# Patient Record
Sex: FEMALE | Race: WHITE | NOT HISPANIC OR LATINO | URBAN - METROPOLITAN AREA
[De-identification: names, ages, dates, MRNs, and addresses within clinical notes are randomized per-mention and may not be internally consistent; named-entity substitution may affect disease eponyms.]

---

## 2018-12-31 ENCOUNTER — OFFICE VISIT (OUTPATIENT)
Dept: FAMILY MEDICINE CLINIC | Facility: CLINIC | Age: 48
End: 2018-12-31

## 2018-12-31 VITALS
HEIGHT: 64 IN | DIASTOLIC BLOOD PRESSURE: 64 MMHG | HEART RATE: 68 BPM | SYSTOLIC BLOOD PRESSURE: 102 MMHG | RESPIRATION RATE: 18 BRPM | WEIGHT: 121 LBS | BODY MASS INDEX: 20.66 KG/M2 | TEMPERATURE: 97.5 F

## 2018-12-31 DIAGNOSIS — Z13.6 ENCOUNTER FOR LIPID SCREENING FOR CARDIOVASCULAR DISEASE: Primary | ICD-10-CM

## 2018-12-31 DIAGNOSIS — Z13.220 ENCOUNTER FOR LIPID SCREENING FOR CARDIOVASCULAR DISEASE: Primary | ICD-10-CM

## 2018-12-31 DIAGNOSIS — G43.109 MIGRAINE WITH AURA AND WITHOUT STATUS MIGRAINOSUS, NOT INTRACTABLE: ICD-10-CM

## 2018-12-31 LAB
ALBUMIN SERPL-MCNC: 4.31 G/DL (ref 3.2–4.8)
ALBUMIN/GLOB SERPL: 2 G/DL (ref 1.5–2.5)
ALP SERPL-CCNC: 43 U/L (ref 25–100)
ALT SERPL-CCNC: 21 U/L (ref 7–40)
AST SERPL-CCNC: 21 U/L (ref 0–33)
BILIRUB SERPL-MCNC: 0.6 MG/DL (ref 0.3–1.2)
BUN SERPL-MCNC: 15 MG/DL (ref 9–23)
BUN/CREAT SERPL: 19.5 (ref 7–25)
CALCIUM SERPL-MCNC: 9.1 MG/DL (ref 8.7–10.4)
CHLORIDE SERPL-SCNC: 107 MMOL/L (ref 99–109)
CHOLEST SERPL-MCNC: 180 MG/DL (ref 0–200)
CHOLEST/HDLC SERPL: 2.65 {RATIO}
CO2 SERPL-SCNC: 27 MMOL/L (ref 20–31)
CREAT SERPL-MCNC: 0.77 MG/DL (ref 0.6–1.3)
GLOBULIN SER CALC-MCNC: 2.2 GM/DL
GLUCOSE SERPL-MCNC: 66 MG/DL (ref 70–100)
HDLC SERPL-MCNC: 68 MG/DL (ref 40–60)
LDLC SERPL CALC-MCNC: 99 MG/DL (ref 0–100)
POTASSIUM SERPL-SCNC: 4.2 MMOL/L (ref 3.5–5.5)
PROT SERPL-MCNC: 6.5 G/DL (ref 5.7–8.2)
SODIUM SERPL-SCNC: 141 MMOL/L (ref 132–146)
TRIGL SERPL-MCNC: 67 MG/DL (ref 0–150)
VLDLC SERPL CALC-MCNC: 13.4 MG/DL

## 2018-12-31 PROCEDURE — 99202 OFFICE O/P NEW SF 15 MIN: CPT | Performed by: FAMILY MEDICINE

## 2018-12-31 RX ORDER — NORETHINDRONE ACETATE AND ETHINYL ESTRADIOL, ETHINYL ESTRADIOL AND FERROUS FUMARATE 1MG-10(24)
KIT ORAL DAILY
Refills: 3 | COMMUNITY
Start: 2018-12-01

## 2018-12-31 RX ORDER — VALACYCLOVIR HYDROCHLORIDE 1 G/1
TABLET, FILM COATED ORAL
Refills: 4 | COMMUNITY
Start: 2018-12-19

## 2018-12-31 RX ORDER — SUMATRIPTAN 50 MG/1
50 TABLET, FILM COATED ORAL
Qty: 12 TABLET | Refills: 5 | Status: SHIPPED | OUTPATIENT
Start: 2018-12-31

## 2018-12-31 RX ORDER — MULTIVITAMIN
TABLET ORAL
COMMUNITY
Start: 2014-08-19

## 2018-12-31 RX ORDER — SUMATRIPTAN 50 MG/1
TABLET, FILM COATED ORAL
COMMUNITY
Start: 2014-08-19 | End: 2018-12-31 | Stop reason: SDUPTHER

## 2018-12-31 NOTE — PROGRESS NOTES
Assessment/Plan       Problems Addressed this Visit        Cardiovascular and Mediastinum    Migraine with aura and without status migrainosus, not intractable    Relevant Medications    SUMAtriptan (IMITREX) 50 MG tablet      Other Visit Diagnoses     Encounter for lipid screening for cardiovascular disease    -  Primary    Relevant Orders    Comprehensive Metabolic Panel    Lipid Panel With / Chol / HDL Ratio            Follow up: Return if symptoms worsen or fail to improve.     DISCUSSION  Here for follow-up of migraine headaches.  Has been greater than 3 years since she was last seen.  Refilled Imitrex since it is helping.  If headaches become more frequent, she will let us know.    Check labs for lipid screening.      MEDICATIONS PRESCRIBED  Requested Prescriptions     Signed Prescriptions Disp Refills   • SUMAtriptan (IMITREX) 50 MG tablet 12 tablet 5     Sig: Take 1 tablet by mouth Every 2 (Two) Hours As Needed for Migraine. No greater than 2 in 24 hour period            -------------------------------------------    Subjective     Chief Complaint   Patient presents with   • Migraine     refill of migraine   • Biometric Screening     wanted to check cholesterol?         History of Present Illness    Here for follow-up of migraine headaches.  Has not been seen here since August 2014.    Migraine    Gets once about every 3-4 months  Typical migraine, sees a pinpoint block, visual aura and then headache after that,   Sumatriptan helps quickly and takes aleve as well  Still gets some pain but kwaku to manage it    Last month, had 2 headaches on the same day  Was better and then came back in 2 hours    Would like to have chol checked  Not eaten today  Had one bite of pop tart    No change in history    Left great toe, decreased sensation x 6 months  In the corner only,one small spot.   Not completely numb  No back pain   No diabetic    LMP: on OCP for endometriosis and no periods  Sees Oll[facility] GARCIA  "[facility]  mammogram 2017 and normal        Social History     Tobacco Use   Smoking Status Never Smoker   Smokeless Tobacco Never Used        Past Medical History,Medications, Allergies, and social history was reviewed.      Review of Systems   Constitutional: Negative.    HENT: Negative.    Respiratory: Negative.    Cardiovascular: Negative.    Gastrointestinal: Negative.    Musculoskeletal: Negative.    Neurological: Positive for headache.   Psychiatric/Behavioral: Negative.        Objective     Vitals:    12/31/18 1010   BP: 102/64   Pulse: 68   Resp: 18   Temp: 97.5 °F (36.4 °C)   Weight: 54.9 kg (121 lb)   Height: 162.6 cm (64\")          Physical Exam   Constitutional: She appears well-developed and well-nourished.   HENT:   Head: Normocephalic and atraumatic.   Right Ear: Hearing, tympanic membrane, external ear and ear canal normal.   Left Ear: Hearing, tympanic membrane, external ear and ear canal normal.   Mouth/Throat: Oropharynx is clear and moist.   Eyes: Conjunctivae and EOM are normal. Pupils are equal, round, and reactive to light.   Neck: Normal range of motion. Neck supple. No thyromegaly present.   Cardiovascular: Normal rate, regular rhythm and normal heart sounds. Exam reveals no gallop and no friction rub.   No murmur heard.  Pulmonary/Chest: Effort normal and breath sounds normal. No respiratory distress. She has no wheezes. She has no rales.   Abdominal: Soft. Bowel sounds are normal. She exhibits no distension. There is no tenderness. There is no rebound and no guarding.   Musculoskeletal: She exhibits no edema.   Neurological: She is alert.   Skin: Skin is warm and dry.   Psychiatric: She has a normal mood and affect.   Nursing note and vitals reviewed.                Taurus Freedman MD    "

## 2020-05-26 ENCOUNTER — OFFICE VISIT (OUTPATIENT)
Dept: FAMILY MEDICINE CLINIC | Facility: CLINIC | Age: 50
End: 2020-05-26

## 2020-05-26 VITALS
SYSTOLIC BLOOD PRESSURE: 106 MMHG | BODY MASS INDEX: 21.07 KG/M2 | RESPIRATION RATE: 16 BRPM | TEMPERATURE: 98.4 F | HEART RATE: 85 BPM | WEIGHT: 123.4 LBS | DIASTOLIC BLOOD PRESSURE: 70 MMHG | OXYGEN SATURATION: 98 % | HEIGHT: 64 IN

## 2020-05-26 DIAGNOSIS — M79.10 MYALGIA: Primary | ICD-10-CM

## 2020-05-26 DIAGNOSIS — M25.50 ARTHRALGIA OF MULTIPLE JOINTS: ICD-10-CM

## 2020-05-26 PROCEDURE — 99214 OFFICE O/P EST MOD 30 MIN: CPT | Performed by: FAMILY MEDICINE

## 2020-05-26 RX ORDER — LORATADINE 10 MG/1
10 TABLET ORAL DAILY
COMMUNITY

## 2020-05-26 NOTE — PROGRESS NOTES
Assessment/Plan       Problems Addressed this Visit     None      Visit Diagnoses     Myalgia    -  Primary    Relevant Orders    CBC & Differential    Comprehensive Metabolic Panel    Sedimentation Rate    STEPHANIE by IFA, Reflex 9-biomarkers profile    Rheumatoid Factor    C-reactive Protein    Arthralgia of multiple joints        Relevant Orders    CBC & Differential    Comprehensive Metabolic Panel    Sedimentation Rate    STEPHANIE by IFA, Reflex 9-biomarkers profile    Rheumatoid Factor    C-reactive Protein            Follow up: Return if symptoms worsen or fail to improve.     DISCUSSION  Persistent recurrent myalgia and arthralgia.  Check labs as noted.  Family history of polymyalgia rheumatica.    Further plan once we get labs back.        MEDICATIONS PRESCRIBED  Requested Prescriptions      No prescriptions requested or ordered in this encounter            -------------------------------------------    Subjective     Chief Complaint   Patient presents with   • Muscle Pain     worse in the morning, better throughout the day, soreness/pain         Muscle Pain   This is a new (seems to be muscle and not joint. no new meds. ) problem. Episode onset: one month. The problem occurs daily (in the am and is worse). The problem has been waxing and waning since onset. Associated with: no new meds. Pain location: both legs, back of legs, around the knees, hips and the top oif the hamstrings, calf, top of back, shoulders and neck.  The pain is medium. Pertinent negatives include no fever, rash or shortness of breath. Past treatments include nothing. The treatment provided no relief. There is no swelling present. There is no history of chronic back pain or rheumatic disease.       Mom has PMR, no meds now and she had been on prednisone    no recent illness    Lasts 7-8 pain scale in am   Worse at 2 hrs  Now 2-3 in the late afternoon and in the evening    Hurts to bend leg and back          Social History     Tobacco Use  "  Smoking Status Never Smoker   Smokeless Tobacco Never Used          Past Medical History,Medications, Allergies, and social history was reviewed.          Review of Systems   Constitutional: Negative.  Negative for fever.   HENT: Negative.    Respiratory: Negative.  Negative for shortness of breath.    Cardiovascular: Negative.    Gastrointestinal: Negative.    Musculoskeletal: Positive for arthralgias and myalgias.   Skin: Negative for rash.   Neurological: Negative.    Psychiatric/Behavioral: Negative.        Objective     Vitals:    05/26/20 1645   BP: 106/70   Pulse: 85   Resp: 16   Temp: 98.4 °F (36.9 °C)   TempSrc: Temporal   SpO2: 98%   Weight: 56 kg (123 lb 6.4 oz)   Height: 162.6 cm (64.02\")          Physical Exam   Constitutional: She is oriented to person, place, and time. She appears well-developed and well-nourished.   HENT:   Head: Normocephalic and atraumatic.   Right Ear: Hearing and external ear normal.   Left Ear: Hearing and external ear normal.   Mouth/Throat: Oropharynx is clear and moist.   Eyes: Pupils are equal, round, and reactive to light. Conjunctivae and EOM are normal.   Cardiovascular: Normal rate, regular rhythm and normal heart sounds. Exam reveals no friction rub.   No murmur heard.  Pulmonary/Chest: Effort normal and breath sounds normal. No respiratory distress. She has no wheezes. She has no rales.   Musculoskeletal:   Diffuse soreness of both lower extremities and thighs and knees.  Also along shoulders and upper back.   Neurological: She is alert and oriented to person, place, and time.   Skin: Skin is warm.   Psychiatric: She has a normal mood and affect. Her behavior is normal.   Nursing note and vitals reviewed.    No significant swelling of the proximal hand joints.            Taurus Freedman MD    "

## 2020-05-29 ENCOUNTER — TELEPHONE (OUTPATIENT)
Dept: FAMILY MEDICINE CLINIC | Facility: CLINIC | Age: 50
End: 2020-05-29

## 2020-05-29 DIAGNOSIS — M79.10 MYALGIA: ICD-10-CM

## 2020-05-29 DIAGNOSIS — M25.50 ARTHRALGIA OF MULTIPLE JOINTS: Primary | ICD-10-CM

## 2020-05-29 LAB
ALBUMIN SERPL-MCNC: 4 G/DL (ref 3.5–5.2)
ALBUMIN/GLOB SERPL: 1.4 G/DL
ALP SERPL-CCNC: 55 U/L (ref 39–117)
ALT SERPL-CCNC: 12 U/L (ref 1–33)
ANA HOMOGEN TITR SER: ABNORMAL {TITER}
ANA TITR SER IF: POSITIVE {TITER}
AST SERPL-CCNC: 15 U/L (ref 1–32)
BASOPHILS # BLD AUTO: 0.05 10*3/MM3 (ref 0–0.2)
BASOPHILS NFR BLD AUTO: 0.7 % (ref 0–1.5)
BILIRUB SERPL-MCNC: 0.4 MG/DL (ref 0.2–1.2)
BUN SERPL-MCNC: 11 MG/DL (ref 6–20)
BUN/CREAT SERPL: 14.9 (ref 7–25)
CALCIUM SERPL-MCNC: 9.1 MG/DL (ref 8.6–10.5)
CENTROMERE B AB SER-ACNC: <0.2 AI (ref 0–0.9)
CHLORIDE SERPL-SCNC: 101 MMOL/L (ref 98–107)
CHROMATIN AB SERPL-ACNC: <0.2 AI (ref 0–0.9)
CO2 SERPL-SCNC: 24.7 MMOL/L (ref 22–29)
CREAT SERPL-MCNC: 0.74 MG/DL (ref 0.57–1)
CRP SERPL-MCNC: 3.43 MG/DL (ref 0–0.5)
DSDNA AB SER-ACNC: <1 IU/ML (ref 0–9)
ENA JO1 AB SER-ACNC: <0.2 AI (ref 0–0.9)
ENA RNP AB SER-ACNC: 0.3 AI (ref 0–0.9)
ENA SCL70 AB SER-ACNC: <0.2 AI (ref 0–0.9)
ENA SM AB SER-ACNC: <0.2 AI (ref 0–0.9)
ENA SS-A AB SER-ACNC: <0.2 AI (ref 0–0.9)
ENA SS-B AB SER-ACNC: <0.2 AI (ref 0–0.9)
EOSINOPHIL # BLD AUTO: 0.1 10*3/MM3 (ref 0–0.4)
EOSINOPHIL NFR BLD AUTO: 1.3 % (ref 0.3–6.2)
ERYTHROCYTE [DISTWIDTH] IN BLOOD BY AUTOMATED COUNT: 12.7 % (ref 12.3–15.4)
ERYTHROCYTE [SEDIMENTATION RATE] IN BLOOD BY WESTERGREN METHOD: 35 MM/HR (ref 0–20)
GLOBULIN SER CALC-MCNC: 2.8 GM/DL
GLUCOSE SERPL-MCNC: 83 MG/DL (ref 65–99)
HCT VFR BLD AUTO: 39.3 % (ref 34–46.6)
HGB BLD-MCNC: 13.4 G/DL (ref 12–15.9)
IMM GRANULOCYTES # BLD AUTO: 0.03 10*3/MM3 (ref 0–0.05)
IMM GRANULOCYTES NFR BLD AUTO: 0.4 % (ref 0–0.5)
LABORATORY COMMENT REPORT: ABNORMAL
LYMPHOCYTES # BLD AUTO: 1.38 10*3/MM3 (ref 0.7–3.1)
LYMPHOCYTES NFR BLD AUTO: 18.1 % (ref 19.6–45.3)
Lab: ABNORMAL
Lab: ABNORMAL
MCH RBC QN AUTO: 32.9 PG (ref 26.6–33)
MCHC RBC AUTO-ENTMCNC: 34.1 G/DL (ref 31.5–35.7)
MCV RBC AUTO: 96.6 FL (ref 79–97)
MONOCYTES # BLD AUTO: 0.41 10*3/MM3 (ref 0.1–0.9)
MONOCYTES NFR BLD AUTO: 5.4 % (ref 5–12)
NEUTROPHILS # BLD AUTO: 5.66 10*3/MM3 (ref 1.7–7)
NEUTROPHILS NFR BLD AUTO: 74.1 % (ref 42.7–76)
NRBC BLD AUTO-RTO: 0 /100 WBC (ref 0–0.2)
PLATELET # BLD AUTO: 315 10*3/MM3 (ref 140–450)
POTASSIUM SERPL-SCNC: 4.3 MMOL/L (ref 3.5–5.2)
PROT SERPL-MCNC: 6.8 G/DL (ref 6–8.5)
RBC # BLD AUTO: 4.07 10*6/MM3 (ref 3.77–5.28)
RHEUMATOID FACT SERPL-ACNC: <10 IU/ML (ref 0–13.9)
SODIUM SERPL-SCNC: 138 MMOL/L (ref 136–145)
WBC # BLD AUTO: 7.63 10*3/MM3 (ref 3.4–10.8)

## 2020-05-29 RX ORDER — PREDNISONE 20 MG/1
40 TABLET ORAL DAILY
Qty: 10 TABLET | Refills: 0 | Status: SHIPPED | OUTPATIENT
Start: 2020-05-29 | End: 2020-06-03

## 2020-05-29 NOTE — TELEPHONE ENCOUNTER
Patient is calling stating that she is in major pain, she had labs done yesterday and was told those results would be in today. Patient wanted to know if Dr. Freedman could call in prednisone or something that he thinks would be beneficial to help with the pain. Patient would like this medication today if possible because she can not go all weekend with the pain she is in. Please advise and call back    [phone]    Confirmed pharmacy

## 2020-05-29 NOTE — TELEPHONE ENCOUNTER
Please call, looks like labs not back yet but will send in some prednisone over the weekend. It will be a 5 day course and should have the results by then. I sent to [facility] in [place]. Don't take the Prednisone in the evening. Earlier in the day better since can keep her awake. Take with food. CAn take both pills each day at the same time. Tylenol ok to take if needed but not ibuprofen or aleve.

## 2020-05-29 NOTE — TELEPHONE ENCOUNTER
S/W pt to inform her Prednisone is sent into pharmacy. Informed her of instructions on taking medication. Pt verbalized understanding and appreciation.

## 2020-06-01 DIAGNOSIS — R76.8 ANA POSITIVE: Primary | ICD-10-CM

## 2020-06-01 DIAGNOSIS — M25.50 ARTHRALGIA OF MULTIPLE JOINTS: ICD-10-CM

## 2020-06-01 RX ORDER — PREDNISONE 10 MG/1
TABLET ORAL
Qty: 30 TABLET | Refills: 0 | Status: SHIPPED | OUTPATIENT
Start: 2020-06-01 | End: 2020-06-16

## 2020-06-16 DIAGNOSIS — M79.10 MYALGIA: Primary | ICD-10-CM

## 2020-06-16 RX ORDER — PREDNISONE 10 MG/1
TABLET ORAL
Qty: 20 TABLET | Refills: 0 | Status: SHIPPED | OUTPATIENT
Start: 2020-06-16

## 2024-06-08 PROBLEM — R79.82 CRP ELEVATED: Status: ACTIVE | Noted: 2024-06-08

## 2024-06-08 PROBLEM — R70.0 ESR RAISED: Status: ACTIVE | Noted: 2024-06-08

## 2024-06-08 PROBLEM — M35.3 PMR (POLYMYALGIA RHEUMATICA): Status: ACTIVE | Noted: 2024-06-08

## 2024-06-08 PROBLEM — M79.7 FIBROMYALGIA: Status: ACTIVE | Noted: 2024-06-08

## 2024-06-08 PROBLEM — R76.8 POSITIVE ANA (ANTINUCLEAR ANTIBODY): Status: ACTIVE | Noted: 2024-06-08

## 2024-06-08 PROBLEM — M19.90 OSTEOARTHRITIS: Status: ACTIVE | Noted: 2024-06-08

## 2024-06-10 ENCOUNTER — LAB (OUTPATIENT)
Facility: HOSPITAL | Age: 54
End: 2024-06-10
Payer: COMMERCIAL

## 2024-06-10 ENCOUNTER — OFFICE VISIT (OUTPATIENT)
Age: 54
End: 2024-06-10
Payer: COMMERCIAL

## 2024-06-10 VITALS
HEART RATE: 78 BPM | BODY MASS INDEX: 22.02 KG/M2 | WEIGHT: 129 LBS | SYSTOLIC BLOOD PRESSURE: 128 MMHG | TEMPERATURE: 97.5 F | DIASTOLIC BLOOD PRESSURE: 78 MMHG | HEIGHT: 64 IN

## 2024-06-10 DIAGNOSIS — Z79.1 NSAID LONG-TERM USE: Chronic | ICD-10-CM

## 2024-06-10 DIAGNOSIS — M15.9 PRIMARY OSTEOARTHRITIS INVOLVING MULTIPLE JOINTS: Chronic | ICD-10-CM

## 2024-06-10 DIAGNOSIS — M35.3 PMR (POLYMYALGIA RHEUMATICA): Chronic | ICD-10-CM

## 2024-06-10 DIAGNOSIS — M35.3 PMR (POLYMYALGIA RHEUMATICA): Primary | Chronic | ICD-10-CM

## 2024-06-10 DIAGNOSIS — M79.7 FIBROMYALGIA: Chronic | ICD-10-CM

## 2024-06-10 DIAGNOSIS — L98.9 SKIN LESION OF BACK: ICD-10-CM

## 2024-06-10 PROCEDURE — 85025 COMPLETE CBC W/AUTO DIFF WBC: CPT

## 2024-06-10 PROCEDURE — 86140 C-REACTIVE PROTEIN: CPT

## 2024-06-10 PROCEDURE — 99214 OFFICE O/P EST MOD 30 MIN: CPT | Performed by: INTERNAL MEDICINE

## 2024-06-10 PROCEDURE — 36415 COLL VENOUS BLD VENIPUNCTURE: CPT

## 2024-06-10 PROCEDURE — 85652 RBC SED RATE AUTOMATED: CPT

## 2024-06-10 PROCEDURE — 80053 COMPREHEN METABOLIC PANEL: CPT

## 2024-06-10 RX ORDER — MELOXICAM 15 MG/1
15 TABLET ORAL DAILY PRN
Qty: 30 TABLET | Refills: 6 | Status: SHIPPED | OUTPATIENT
Start: 2024-06-10

## 2024-06-10 RX ORDER — DULOXETIN HYDROCHLORIDE 30 MG/1
30 CAPSULE, DELAYED RELEASE ORAL DAILY
Qty: 30 CAPSULE | Refills: 6 | Status: SHIPPED | OUTPATIENT
Start: 2024-06-10

## 2024-06-10 NOTE — ASSESSMENT & PLAN NOTE
1. H & P consistent with this diagnosis.   2. Encourage aerobic activity and sleep hygiene.  3. If she has not had a sleep study/consultation consider getting this done.   4. We gave her an educational handout to take home and review regarding fibromyalgia.   5. She will follow up with us in 6 months.  6. Tylenol PRN is ok as directed as directed  7. Today she informed me that she lowered her duloxetine from 30 mg BID to 30 mg once/day.   8. Continue/refill Meloxicam PRN once/day

## 2024-06-10 NOTE — PROGRESS NOTES
Office Follow Up      Date: 06/10/2024   Patient Name: Ellyn Cha  MRN: [id number]  YOB: 1970    Referring Physician: Taurus Freedman MD     Chief Complaint   Patient presents with    Polymyalgia rheumatica     Follow up    Fibromyalgia     Follow up    Osteoarthritis     Follow up       History of Present Illness: Ellyn Cha is a 53 y.o. female who is here today for follow up.     No recent injuries or infections. No fevers.    She established care with us on 6/30/20. Her STEPHANIE test was positive. Her other autoimmune tests were negative/normal. Her CRP test was markedly elevated. We started her on 15 mg/day of prednisone for presumed PMR. She has long since tapered off of prednisone.  We believe her PMR is in remission.     Presently we prescribe her meloxicam 15 mg PO once/day PRN and duloxetine.  She has lowered her duloxetine dose from 30 mg BID to once/day.      Today she rates her pain as 2.5/10 in severity. She has 30 minutes/day of morning stiffness. No red or hot joints.  No joint swelling. No muscle pain or weakness. No back or neck problems.     She has a spot on her back that she would like to have checked. She would like to see dermatology. No hair loss. No rash. No headaches or paresthesias. No lymphadenopathy. No abnormal bruising/bleeding. No GI or  issues. No chest pain or shortness of breath. No sicca symptoms.         Subjective     Review of Systems   Constitutional: Negative.    HENT: Negative.     Eyes: Negative.    Respiratory: Negative.     Cardiovascular: Negative.    Gastrointestinal: Negative.    Endocrine: Negative.    Genitourinary: Negative.    Musculoskeletal:  Positive for arthralgias.   Skin:  Positive for skin lesions (on back).   Allergic/Immunologic: Negative.    Neurological:  Positive for dizziness.   Hematological: Negative.    Psychiatric/Behavioral:  Positive for depressed mood.    All other systems reviewed and are negative.    "      Current Outpatient Medications:     Cholecalciferol (Vitamin D3) 125 MCG (5000 UT) tablet dispersible, Place 1 tablet on the tongue Daily., Disp: , Rfl:     DULoxetine (CYMBALTA) 30 MG capsule, Take 1 capsule by mouth Daily., Disp: 30 capsule, Rfl: 6    estradiol (CLIMARA) 0.1 MG/24HR patch, Place 2 patches on the skin as directed by provider 1 (One) Time Per Week., Disp: , Rfl:     LO LOESTRIN FE 1 MG-10 MCG / 10 MCG tablet, Take  by mouth Daily., Disp: , Rfl: 3    meloxicam (MOBIC) 15 MG tablet, Take 1 tablet by mouth Daily As Needed for Mild Pain., Disp: 30 tablet, Rfl: 6    Multiple Vitamin (MULTI-VITAMIN DAILY) tablet, Take  by mouth., Disp: , Rfl:     Probiotic Product (PROBIOTIC FORMULA PO), Take  by mouth., Disp: , Rfl:     Progesterone (PROMETRIUM) 100 MG capsule, Take 1 capsule by mouth Daily. FOR 10 DAYS IN THE EVENINT, Disp: , Rfl:     valACYclovir (VALTREX) 1000 MG tablet, TK 1 T PO TID, Disp: , Rfl: 4    No Known Allergies    I have reviewed and updated the patient's chief complaint, history of present illness, review of systems, past medical history, surgical history, family history, social history, medications and allergy list as appropriate.     Objective      Vitals:    06/10/24 1553   BP: 128/78   BP Location: Left arm   Pulse: 78   Temp: 97.5 °F (36.4 °C)   Weight: 58.5 kg (129 lb)   Height: 162.6 cm (64\")   PainSc:   2   PainLoc: Leg     Body mass index is 22.14 kg/m².      Physical Exam   General: Well appearing 53 year old  female. Not in distress. She is ambulating unassisted.   SKIN: No rashes. She has a small raised circular lesion on her back. No alopecia. No subcutaneous nodules. No digital pits or ulcers. No sclerodactyly.   HEENT: NCAT. Conjunctiva clear, no photophobia. No oral or nasal ulcers. Hearing intact.    Pulmonary: Clear to auscultation bilaterally. No wheezing, rales, or rhonchi.  CV: Regular rate and rhythm. No murmurs, rubs, or gallops.   Psych: Normal mood " and affect. Alert and oriented x 3.   Extremities: No cyanosis or edema.   Musculoskeletal: No joint swelling. No warmth or erythema. Normal range of motion of the wrists, ankles, elbows, and knees. She has myofascial tenderness above and below the waist.   Lymph: No palpable cervical adenopathy          Procedures    Assessment / Plan      Assessment & Plan  PMR (polymyalgia rheumatica)  * Mother diagnosed with PMR  * Medications/treatments/interventions tried include: Prednisone taper 6/1/20 - 6/13/20, Tylenol, prednisone daily, meloxicam, Cymbalta, CBD products   * 5/27/20: STEPHANIE 1:640 (homogenous), DS DNA normal, RNP normal, Freedman normal, SCL 70 normal, SSA and SSB Normal, Chromatin normal, Centromere normal, CRP 3.43 (<0.50), RF negative, ESR 35 (<30), CMP normal, CBC normal   * 6/30/20 CRP was 55.0, STEPHANIE 1:1280, all other testing was fine  1. We believe that her PMR is in remission.   2. She is not on steroids. She does not have GCA symptoms.   3. Check labs  4. Follow up in 4-6 months  Fibromyalgia  1. H & P consistent with this diagnosis.   2. Encourage aerobic activity and sleep hygiene.  3. If she has not had a sleep study/consultation consider getting this done.   4. We gave her an educational handout to take home and review regarding fibromyalgia.   5. She will follow up with us in 6 months.  6. Tylenol PRN is ok as directed as directed  7. Today she informed me that she lowered her duloxetine from 30 mg BID to 30 mg once/day.   8. Continue/refill Meloxicam PRN once/day   Primary osteoarthritis involving multiple joints  1. Tylenol PRN is ok as directed  2. Continue/refill meloxicam   3. She seems stable today   NSAID long-term use  * Meloxicam 15 mg PO once/day PRN for joint pain relief   Do not take over-the counter anti-inflammatory medicines, such as ibuprofen or naproxen (Advil, Motrin, Aleve and others), as they may cause problems when combined with your prescription medications.  In general, low dose  daily aspirin for the treatment or prevention of heart disease or stroke is safe to take.  Acetaminophen (Tylenol) is generally safe to take as directed for headaches, cramps or other aches and pains or fever reduction.    Skin lesion of back  She has a spot on her back that she would like to have checked. She would like to see dermatology.   We will refer as requested.  To me this looks like a sebaceous cyst vs a lipoma.     Orders Placed This Encounter   Procedures    C-reactive Protein    Comprehensive Metabolic Panel    CBC Auto Differential    Sedimentation Rate    Ambulatory Referral to Dermatology     New Medications Ordered This Visit   Medications    meloxicam (MOBIC) 15 MG tablet     Sig: Take 1 tablet by mouth Daily As Needed for Mild Pain.     Dispense:  30 tablet     Refill:  6    DULoxetine (CYMBALTA) 30 MG capsule     Sig: Take 1 capsule by mouth Daily.     Dispense:  30 capsule     Refill:  6         Follow Up:   Return in about 6 months (around 12/10/2024).      Galo Natarajan DO  [facility]

## 2024-06-10 NOTE — ASSESSMENT & PLAN NOTE
* Mother diagnosed with PMR  * Medications/treatments/interventions tried include: Prednisone taper 6/1/20 - 6/13/20, Tylenol, prednisone daily, meloxicam, Cymbalta, CBD products   * 5/27/20: STEPHANIE 1:640 (homogenous), DS DNA normal, RNP normal, Freedman normal, SCL 70 normal, SSA and SSB Normal, Chromatin normal, Centromere normal, CRP 3.43 (<0.50), RF negative, ESR 35 (<30), CMP normal, CBC normal   * 6/30/20 CRP was 55.0, STEPHANIE 1:1280, all other testing was fine  1. We believe that her PMR is in remission.   2. She is not on steroids. She does not have GCA symptoms.   3. Check labs  4. Follow up in 4-6 months

## 2024-06-10 NOTE — ASSESSMENT & PLAN NOTE
* Meloxicam 15 mg PO once/day PRN for joint pain relief   Do not take over-the counter anti-inflammatory medicines, such as ibuprofen or naproxen (Advil, Motrin, Aleve and others), as they may cause problems when combined with your prescription medications.  In general, low dose daily aspirin for the treatment or prevention of heart disease or stroke is safe to take.  Acetaminophen (Tylenol) is generally safe to take as directed for headaches, cramps or other aches and pains or fever reduction.

## 2024-06-11 LAB
ALBUMIN SERPL-MCNC: 4.5 G/DL (ref 3.5–5.2)
ALBUMIN/GLOB SERPL: 1.9 G/DL
ALP SERPL-CCNC: 45 U/L (ref 39–117)
ALT SERPL W P-5'-P-CCNC: 15 U/L (ref 1–33)
ANION GAP SERPL CALCULATED.3IONS-SCNC: 8 MMOL/L (ref 5–15)
AST SERPL-CCNC: 16 U/L (ref 1–32)
BASOPHILS # BLD AUTO: 0.07 10*3/MM3 (ref 0–0.2)
BASOPHILS NFR BLD AUTO: 0.7 % (ref 0–1.5)
BILIRUB SERPL-MCNC: 0.3 MG/DL (ref 0–1.2)
BUN SERPL-MCNC: 18 MG/DL (ref 6–20)
BUN/CREAT SERPL: 23.1 (ref 7–25)
CALCIUM SPEC-SCNC: 9.5 MG/DL (ref 8.6–10.5)
CHLORIDE SERPL-SCNC: 104 MMOL/L (ref 98–107)
CO2 SERPL-SCNC: 29 MMOL/L (ref 22–29)
CREAT SERPL-MCNC: 0.78 MG/DL (ref 0.57–1)
CRP SERPL-MCNC: <0.3 MG/DL (ref 0–0.5)
DEPRECATED RDW RBC AUTO: 46.5 FL (ref 37–54)
EGFRCR SERPLBLD CKD-EPI 2021: 90.4 ML/MIN/1.73
EOSINOPHIL # BLD AUTO: 0.19 10*3/MM3 (ref 0–0.4)
EOSINOPHIL NFR BLD AUTO: 2 % (ref 0.3–6.2)
ERYTHROCYTE [DISTWIDTH] IN BLOOD BY AUTOMATED COUNT: 13.1 % (ref 12.3–15.4)
ERYTHROCYTE [SEDIMENTATION RATE] IN BLOOD: 5 MM/HR (ref 0–30)
GLOBULIN UR ELPH-MCNC: 2.4 GM/DL
GLUCOSE SERPL-MCNC: 97 MG/DL (ref 65–99)
HCT VFR BLD AUTO: 38.5 % (ref 34–46.6)
HGB BLD-MCNC: 13 G/DL (ref 12–15.9)
IMM GRANULOCYTES # BLD AUTO: 0.04 10*3/MM3 (ref 0–0.05)
IMM GRANULOCYTES NFR BLD AUTO: 0.4 % (ref 0–0.5)
LYMPHOCYTES # BLD AUTO: 1.93 10*3/MM3 (ref 0.7–3.1)
LYMPHOCYTES NFR BLD AUTO: 19.9 % (ref 19.6–45.3)
MCH RBC QN AUTO: 32.9 PG (ref 26.6–33)
MCHC RBC AUTO-ENTMCNC: 33.8 G/DL (ref 31.5–35.7)
MCV RBC AUTO: 97.5 FL (ref 79–97)
MONOCYTES # BLD AUTO: 0.38 10*3/MM3 (ref 0.1–0.9)
MONOCYTES NFR BLD AUTO: 3.9 % (ref 5–12)
NEUTROPHILS NFR BLD AUTO: 7.07 10*3/MM3 (ref 1.7–7)
NEUTROPHILS NFR BLD AUTO: 73.1 % (ref 42.7–76)
NRBC BLD AUTO-RTO: 0 /100 WBC (ref 0–0.2)
PLATELET # BLD AUTO: 238 10*3/MM3 (ref 140–450)
PMV BLD AUTO: 10.4 FL (ref 6–12)
POTASSIUM SERPL-SCNC: 4.1 MMOL/L (ref 3.5–5.2)
PROT SERPL-MCNC: 6.9 G/DL (ref 6–8.5)
RBC # BLD AUTO: 3.95 10*6/MM3 (ref 3.77–5.28)
SODIUM SERPL-SCNC: 141 MMOL/L (ref 136–145)
WBC NRBC COR # BLD AUTO: 9.68 10*3/MM3 (ref 3.4–10.8)

## 2024-12-13 ENCOUNTER — LAB (OUTPATIENT)
Facility: HOSPITAL | Age: 54
End: 2024-12-13
Payer: COMMERCIAL

## 2024-12-13 ENCOUNTER — OFFICE VISIT (OUTPATIENT)
Age: 54
End: 2024-12-13
Payer: COMMERCIAL

## 2024-12-13 VITALS
HEART RATE: 87 BPM | BODY MASS INDEX: 21.91 KG/M2 | HEIGHT: 64 IN | WEIGHT: 128.3 LBS | SYSTOLIC BLOOD PRESSURE: 110 MMHG | DIASTOLIC BLOOD PRESSURE: 72 MMHG | TEMPERATURE: 97.5 F

## 2024-12-13 DIAGNOSIS — M79.7 FIBROMYALGIA: Chronic | ICD-10-CM

## 2024-12-13 DIAGNOSIS — M35.3 PMR (POLYMYALGIA RHEUMATICA): Chronic | ICD-10-CM

## 2024-12-13 DIAGNOSIS — Z79.1 NSAID LONG-TERM USE: Chronic | ICD-10-CM

## 2024-12-13 DIAGNOSIS — M35.3 PMR (POLYMYALGIA RHEUMATICA): Primary | Chronic | ICD-10-CM

## 2024-12-13 DIAGNOSIS — M15.0 PRIMARY OSTEOARTHRITIS INVOLVING MULTIPLE JOINTS: Chronic | ICD-10-CM

## 2024-12-13 LAB
ALBUMIN SERPL-MCNC: 4.3 G/DL (ref 3.5–5.2)
ALBUMIN/GLOB SERPL: 1.5 G/DL
ALP SERPL-CCNC: 55 U/L (ref 39–117)
ALT SERPL W P-5'-P-CCNC: 18 U/L (ref 1–33)
ANION GAP SERPL CALCULATED.3IONS-SCNC: 10 MMOL/L (ref 5–15)
AST SERPL-CCNC: 23 U/L (ref 1–32)
BASOPHILS # BLD AUTO: 0.06 10*3/MM3 (ref 0–0.2)
BASOPHILS NFR BLD AUTO: 0.7 % (ref 0–1.5)
BILIRUB SERPL-MCNC: 0.2 MG/DL (ref 0–1.2)
BUN SERPL-MCNC: 16 MG/DL (ref 6–20)
BUN/CREAT SERPL: 21.6 (ref 7–25)
CALCIUM SPEC-SCNC: 9.6 MG/DL (ref 8.6–10.5)
CHLORIDE SERPL-SCNC: 103 MMOL/L (ref 98–107)
CO2 SERPL-SCNC: 26 MMOL/L (ref 22–29)
CREAT SERPL-MCNC: 0.74 MG/DL (ref 0.57–1)
CRP SERPL-MCNC: <0.3 MG/DL (ref 0–0.5)
DEPRECATED RDW RBC AUTO: 45.4 FL (ref 37–54)
EGFRCR SERPLBLD CKD-EPI 2021: 95.7 ML/MIN/1.73
EOSINOPHIL # BLD AUTO: 0.13 10*3/MM3 (ref 0–0.4)
EOSINOPHIL NFR BLD AUTO: 1.6 % (ref 0.3–6.2)
ERYTHROCYTE [DISTWIDTH] IN BLOOD BY AUTOMATED COUNT: 13 % (ref 12.3–15.4)
GLOBULIN UR ELPH-MCNC: 2.9 GM/DL
GLUCOSE SERPL-MCNC: 84 MG/DL (ref 65–99)
HCT VFR BLD AUTO: 38 % (ref 34–46.6)
HGB BLD-MCNC: 12.9 G/DL (ref 12–15.9)
IMM GRANULOCYTES # BLD AUTO: 0.01 10*3/MM3 (ref 0–0.05)
IMM GRANULOCYTES NFR BLD AUTO: 0.1 % (ref 0–0.5)
LYMPHOCYTES # BLD AUTO: 2.08 10*3/MM3 (ref 0.7–3.1)
LYMPHOCYTES NFR BLD AUTO: 25.7 % (ref 19.6–45.3)
MCH RBC QN AUTO: 32.8 PG (ref 26.6–33)
MCHC RBC AUTO-ENTMCNC: 33.9 G/DL (ref 31.5–35.7)
MCV RBC AUTO: 96.7 FL (ref 79–97)
MONOCYTES # BLD AUTO: 0.48 10*3/MM3 (ref 0.1–0.9)
MONOCYTES NFR BLD AUTO: 5.9 % (ref 5–12)
NEUTROPHILS NFR BLD AUTO: 5.32 10*3/MM3 (ref 1.7–7)
NEUTROPHILS NFR BLD AUTO: 66 % (ref 42.7–76)
NRBC BLD AUTO-RTO: 0 /100 WBC (ref 0–0.2)
PLATELET # BLD AUTO: 263 10*3/MM3 (ref 140–450)
PMV BLD AUTO: 10.7 FL (ref 6–12)
POTASSIUM SERPL-SCNC: 4.1 MMOL/L (ref 3.5–5.2)
PROT SERPL-MCNC: 7.2 G/DL (ref 6–8.5)
RBC # BLD AUTO: 3.93 10*6/MM3 (ref 3.77–5.28)
SODIUM SERPL-SCNC: 139 MMOL/L (ref 136–145)
WBC NRBC COR # BLD AUTO: 8.08 10*3/MM3 (ref 3.4–10.8)

## 2024-12-13 PROCEDURE — 80053 COMPREHEN METABOLIC PANEL: CPT

## 2024-12-13 PROCEDURE — 86140 C-REACTIVE PROTEIN: CPT

## 2024-12-13 PROCEDURE — 85025 COMPLETE CBC W/AUTO DIFF WBC: CPT

## 2024-12-13 PROCEDURE — 99214 OFFICE O/P EST MOD 30 MIN: CPT | Performed by: INTERNAL MEDICINE

## 2024-12-13 PROCEDURE — 85652 RBC SED RATE AUTOMATED: CPT

## 2024-12-13 PROCEDURE — 36415 COLL VENOUS BLD VENIPUNCTURE: CPT

## 2024-12-13 RX ORDER — ESTRADIOL 0.1 MG/D
FILM, EXTENDED RELEASE TRANSDERMAL
COMMUNITY
Start: 2024-12-12 | End: 2025-12-12

## 2024-12-13 NOTE — PROGRESS NOTES
Office Follow Up      Date: 12/13/2024   Patient Name: Ellyn Cha  MRN: [id number]  YOB: 1970    Referring Physician: No ref. provider found     Chief Complaint   Patient presents with    Osteoarthritis     Follow up    Fibromyalgia     Follow up     Polymyalgia Rheumatic      Follow up       History of Present Illness: Ellyn Cha is a 54 y.o. female who is here today for follow up.     No recent infections. No fevers.She recently cut her finger while chopping broccoli. She did not require stitches. She was given antibiotics.     She established care with us on 6/30/20. Her STEPHANIE test was positive. Her other autoimmune tests were negative/normal. Her CRP test was markedly elevated. We started her on 15 mg/day of prednisone for presumed PMR. She has long since tapered off of prednisone.  We believe her PMR is in remission.     Presently we prescribe her meloxicam 15 mg PO once/day PRN and duloxetine.       Today she rates her pain as 2/10 in severity. She has 30 minutes/day of morning stiffness. No red or hot joints.  No joint swelling. No muscle weakness. She has muscle pain. She has some neck stiffness.     No hair loss. No rash. She has had headaches. She has numbness in the extremities.  No lymphadenopathy. No abnormal bruising/bleeding. No GI or  issues. No chest pain or shortness of breath. No sicca symptoms.         Subjective     Review of Systems   Constitutional: Negative.    HENT: Negative.     Eyes: Negative.    Respiratory: Negative.     Cardiovascular: Negative.    Gastrointestinal: Negative.    Endocrine: Negative.    Genitourinary: Negative.    Musculoskeletal:  Positive for arthralgias, myalgias and neck stiffness.   Skin:  Positive for wound. Negative for skin lesions.   Allergic/Immunologic: Negative.    Neurological:  Positive for dizziness, light-headedness, numbness and headache.   Hematological: Negative.    Psychiatric/Behavioral:  Positive for  "decreased concentration. Negative for depressed mood. The patient is nervous/anxious.    All other systems reviewed and are negative.         Current Outpatient Medications:     Cholecalciferol (Vitamin D3) 125 MCG (5000 UT) tablet dispersible, Place 1 tablet on the tongue Daily., Disp: , Rfl:     DULoxetine (CYMBALTA) 30 MG capsule, Take 1 capsule by mouth Daily., Disp: 30 capsule, Rfl: 6    estradiol (VIVELLE-DOT) 0.1 MG/24HR patch, Place 1 patch onto the skin twice a week., Disp: , Rfl:     LO LOESTRIN FE 1 MG-10 MCG / 10 MCG tablet, Take  by mouth Daily., Disp: , Rfl: 3    meloxicam (MOBIC) 15 MG tablet, Take 1 tablet by mouth Daily As Needed for Mild Pain., Disp: 30 tablet, Rfl: 6    Multiple Vitamin (MULTI-VITAMIN DAILY) tablet, Take  by mouth., Disp: , Rfl:     Probiotic Product (PROBIOTIC FORMULA PO), Take  by mouth., Disp: , Rfl:     Progesterone (PROMETRIUM) 100 MG capsule, Take 1 capsule by mouth Daily. FOR 10 DAYS IN THE EVENINT, Disp: , Rfl:     valACYclovir (VALTREX) 1000 MG tablet, TK 1 T PO TID, Disp: , Rfl: 4    No Known Allergies    I have reviewed and updated the patient's chief complaint, history of present illness, review of systems, past medical history, surgical history, family history, social history, medications and allergy list as appropriate.     Objective      Vitals:    12/13/24 1207   BP: 110/72   BP Location: Left arm   Patient Position: Sitting   Cuff Size: Adult   Pulse: 87   Temp: 97.5 °F (36.4 °C)   Weight: 58.2 kg (128 lb 4.8 oz)   Height: 162.6 cm (64.02\")   PainSc:   2     Body mass index is 22.01 kg/m².      Physical Exam   General: Well appearing 54 year old  female. Not in distress. She is ambulating unassisted.   SKIN: No rashes. She has a small raised circular lesion on her back. No alopecia. No subcutaneous nodules. No digital pits or ulcers. No sclerodactyly.   HEENT: NCAT. Conjunctiva clear, no photophobia. No oral or nasal ulcers. Hearing intact.    Pulmonary: " Clear to auscultation bilaterally. No wheezing, rales, or rhonchi.  CV: Regular rate and rhythm. No murmurs, rubs, or gallops.   Psych: Normal mood and affect. Alert and oriented x 3.   Extremities: No cyanosis or edema.   Musculoskeletal: No joint swelling. No warmth or erythema. Normal range of motion of the wrists, ankles, elbows, and knees. She has myofascial tenderness above and below the waist.   Lymph: No palpable cervical adenopathy          Procedures    Assessment / Plan      Assessment & Plan  PMR (polymyalgia rheumatica)  * Mother diagnosed with PMR  * Medications/treatments/interventions tried include: Prednisone taper 6/1/20 - 6/13/20, Tylenol, prednisone daily, meloxicam, Cymbalta, CBD products   * 5/27/20: STEPHANIE 1:640 (homogenous), DS DNA normal, RNP normal, Freedman normal, SCL 70 normal, SSA and SSB Normal, Chromatin normal, Centromere normal, CRP 3.43 (<0.50), RF negative, ESR 35 (<30), CMP normal, CBC normal   * 6/30/20 CRP was 55.0, STEPHANIE 1:1280, all other testing was fine  1. We believe that her PMR is in remission.   2. She is not on steroids. She does not have GCA symptoms.   3. Check labs  4. Follow up in 6 months  Fibromyalgia  1. H & P consistent with this diagnosis.   2. Encourage aerobic activity and sleep hygiene.  3. If she has not had a sleep study/consultation consider getting this done.   4. We gave her an educational handout to take home and review regarding OA.   5. She will follow up with us in 6 months.  6. Tylenol PRN is ok as directed as directed  7. Continue duloxetine.   8. Continue/refill Meloxicam PRN once/day   Primary osteoarthritis involving multiple joints  1. Tylenol PRN is ok as directed  2. Continue/refill meloxicam   3. She seems stable today   NSAID long-term use   Meloxicam 15 mg PO once/day PRN for joint pain relief   Do not take over-the counter anti-inflammatory medicines, such as ibuprofen or naproxen (Advil, Motrin, Aleve and others), as they may cause problems  when combined with your prescription medications.  In general, low dose daily aspirin for the treatment or prevention of heart disease or stroke is safe to take.  Acetaminophen (Tylenol) is generally safe to take as directed for headaches, cramps or other aches and pains or fever reduction.    Orders Placed This Encounter   Procedures    CBC Auto Differential    Comprehensive Metabolic Panel    C-reactive Protein    Sedimentation Rate                Follow Up:   Return in about 6 months (around 6/13/2025).      Galo Natarajan DO  [facility]

## 2024-12-13 NOTE — ASSESSMENT & PLAN NOTE
* Mother diagnosed with PMR  * Medications/treatments/interventions tried include: Prednisone taper 6/1/20 - 6/13/20, Tylenol, prednisone daily, meloxicam, Cymbalta, CBD products   * 5/27/20: STEPHANIE 1:640 (homogenous), DS DNA normal, RNP normal, Freedman normal, SCL 70 normal, SSA and SSB Normal, Chromatin normal, Centromere normal, CRP 3.43 (<0.50), RF negative, ESR 35 (<30), CMP normal, CBC normal   * 6/30/20 CRP was 55.0, STEPHANIE 1:1280, all other testing was fine  1. We believe that her PMR is in remission.   2. She is not on steroids. She does not have GCA symptoms.   3. Check labs  4. Follow up in 6 months

## 2024-12-13 NOTE — ASSESSMENT & PLAN NOTE
1. H & P consistent with this diagnosis.   2. Encourage aerobic activity and sleep hygiene.  3. If she has not had a sleep study/consultation consider getting this done.   4. We gave her an educational handout to take home and review regarding OA.   5. She will follow up with us in 6 months.  6. Tylenol PRN is ok as directed as directed  7. Continue duloxetine.   8. Continue/refill Meloxicam PRN once/day

## 2024-12-13 NOTE — ASSESSMENT & PLAN NOTE
Meloxicam 15 mg PO once/day PRN for joint pain relief   Do not take over-the counter anti-inflammatory medicines, such as ibuprofen or naproxen (Advil, Motrin, Aleve and others), as they may cause problems when combined with your prescription medications.  In general, low dose daily aspirin for the treatment or prevention of heart disease or stroke is safe to take.  Acetaminophen (Tylenol) is generally safe to take as directed for headaches, cramps or other aches and pains or fever reduction.

## 2024-12-14 LAB — ERYTHROCYTE [SEDIMENTATION RATE] IN BLOOD: 8 MM/HR (ref 0–30)

## 2025-03-24 RX ORDER — MELOXICAM 15 MG/1
15 TABLET ORAL DAILY PRN
Qty: 30 TABLET | Refills: 6 | Status: SHIPPED | OUTPATIENT
Start: 2025-03-24

## 2025-06-18 RX ORDER — DULOXETIN HYDROCHLORIDE 30 MG/1
30 CAPSULE, DELAYED RELEASE ORAL DAILY
Qty: 90 CAPSULE | Refills: 1 | Status: SHIPPED | OUTPATIENT
Start: 2025-06-18

## 2025-06-18 NOTE — TELEPHONE ENCOUNTER
Rx Refill Note  Requested Prescriptions     Pending Prescriptions Disp Refills    DULoxetine (CYMBALTA) 30 MG capsule [Pharmacy Med Name: DULOXETINE DR 30MG CAPSULES] 30 capsule 6     Sig: TAKE 1 CAPSULE BY MOUTH DAILY      Last office visit with prescribing clinician: 12/13/2024   Last telemedicine visit with prescribing clinician: Visit date not found   Next office visit with prescribing clinician: 11/12/2025                         Would you like a call back once the refill request has been completed: [] Yes [] No    If the office needs to give you a call back, can they leave a voicemail: [] Yes [] No    Danisha Murrell MA  06/18/25, 14:01 EDT      Sent refills to [facility] pharmacy on file.

## 2025-08-13 ENCOUNTER — TRANSCRIBE ORDERS (OUTPATIENT)
Dept: LAB | Facility: HOSPITAL | Age: 55
End: 2025-08-13
Payer: COMMERCIAL

## 2025-08-13 ENCOUNTER — LAB (OUTPATIENT)
Dept: LAB | Facility: HOSPITAL | Age: 55
End: 2025-08-13
Payer: COMMERCIAL

## 2025-08-13 DIAGNOSIS — R53.83 OTHER FATIGUE: ICD-10-CM

## 2025-08-13 DIAGNOSIS — R41.89 AKINETIC MUTISM: ICD-10-CM

## 2025-08-13 DIAGNOSIS — F52.0 HYPOACTIVE SEXUAL DESIRE DISORDER: ICD-10-CM

## 2025-08-13 DIAGNOSIS — F52.0 HYPOACTIVE SEXUAL DESIRE DISORDER: Primary | ICD-10-CM

## 2025-08-13 LAB
25(OH)D3 SERPL-MCNC: 85.8 NG/ML (ref 30–100)
ESTRADIOL SERPL HS-MCNC: 146 PG/ML
VIT B12 BLD-MCNC: 568 PG/ML (ref 211–946)

## 2025-08-13 PROCEDURE — 82627 DEHYDROEPIANDROSTERONE: CPT

## 2025-08-13 PROCEDURE — 82607 VITAMIN B-12: CPT

## 2025-08-13 PROCEDURE — 83498 ASY HYDROXYPROGESTERONE 17-D: CPT

## 2025-08-13 PROCEDURE — 36415 COLL VENOUS BLD VENIPUNCTURE: CPT

## 2025-08-13 PROCEDURE — 84402 ASSAY OF FREE TESTOSTERONE: CPT

## 2025-08-13 PROCEDURE — 82670 ASSAY OF TOTAL ESTRADIOL: CPT

## 2025-08-13 PROCEDURE — 84403 ASSAY OF TOTAL TESTOSTERONE: CPT

## 2025-08-13 PROCEDURE — 84270 ASSAY OF SEX HORMONE GLOBUL: CPT

## 2025-08-13 PROCEDURE — 82306 VITAMIN D 25 HYDROXY: CPT

## 2025-08-14 LAB
DHEA-S SERPL-MCNC: 83.5 UG/DL (ref 29.4–220.5)
SHBG SERPL-SCNC: 122 NMOL/L (ref 17.3–125)

## 2025-08-17 LAB — 17OHP SERPL-MCNC: 24 NG/DL

## 2025-08-20 LAB
TESTOST FREE SERPL-MCNC: 0.4 PG/ML (ref 0–4.2)
TESTOST SERPL-MCNC: 5 NG/DL (ref 4–50)